# Patient Record
Sex: MALE | Race: WHITE | NOT HISPANIC OR LATINO | Employment: OTHER | URBAN - NONMETROPOLITAN AREA
[De-identification: names, ages, dates, MRNs, and addresses within clinical notes are randomized per-mention and may not be internally consistent; named-entity substitution may affect disease eponyms.]

---

## 2023-09-14 ENCOUNTER — TRANSCRIBE ORDERS (OUTPATIENT)
Dept: ADMINISTRATIVE | Facility: HOSPITAL | Age: 63
End: 2023-09-14

## 2023-09-14 DIAGNOSIS — R22.2 PALPABLE MASS OF LOWER BACK: Primary | ICD-10-CM

## 2023-10-11 ENCOUNTER — HOSPITAL ENCOUNTER (OUTPATIENT)
Dept: MRI IMAGING | Facility: HOSPITAL | Age: 63
Discharge: HOME | End: 2023-10-11
Admitting: FAMILY MEDICINE
Payer: COMMERCIAL

## 2023-10-11 DIAGNOSIS — R22.2 PALPABLE MASS OF LOWER BACK: ICD-10-CM

## 2023-10-11 PROCEDURE — 72148 MRI LUMBAR SPINE W/O DYE: CPT

## 2023-10-31 ENCOUNTER — OFFICE VISIT (OUTPATIENT)
Dept: NEUROSURGERY | Facility: CLINIC | Age: 63
End: 2023-10-31
Payer: COMMERCIAL

## 2023-10-31 VITALS — WEIGHT: 172 LBS | BODY MASS INDEX: 24.08 KG/M2 | HEIGHT: 71 IN

## 2023-10-31 DIAGNOSIS — M89.9 LESION OF THORACIC VERTEBRA: ICD-10-CM

## 2023-10-31 DIAGNOSIS — M48.062 SPINAL STENOSIS, LUMBAR REGION, WITH NEUROGENIC CLAUDICATION: Primary | ICD-10-CM

## 2023-10-31 DIAGNOSIS — Z78.9 NON-SMOKER: ICD-10-CM

## 2023-10-31 RX ORDER — SILDENAFIL CITRATE 20 MG/1
TABLET ORAL
COMMUNITY
Start: 2021-11-22

## 2023-10-31 RX ORDER — CHOLECALCIFEROL (VITAMIN D3) 125 MCG
CAPSULE ORAL
COMMUNITY
Start: 2017-01-01

## 2023-10-31 RX ORDER — MAGNESIUM GLUCONATE 30 MG(550)
30 TABLET ORAL
COMMUNITY

## 2023-10-31 NOTE — PROGRESS NOTES
Chief complaint: No chief complaint on file.      Subjective     HPI: Steve comes in today as a consult request from Dr. Oscar Graham for evaluation of T11 vertebral lesion.  This was found incidentally on lumbar MRI and radiology report indicates concern for metastatic process.  The patient denies any pain in that area but does have some chronic low back pain which has gotten progressively worse over the last 4 years.  He also is concerned about a bony protuberance over the lower lumbar area.  Over the last 3 to 4 months he has developed some sharp intermittent pain in his low back with sudden, transient weakness of bilateral lower extremities.  He has persistent radiating pain into the left buttock down the left posterior thigh that fluctuates in intensity.  He currently is utilizing chiropractic care.  He denies focal weakness as well as numbness and tingling to the lower extremities.    He does report some posterior neck pain without radiating pain to the upper extremities as well as some numbness and tingling affecting bilateral lateral forearms.    He denies any personal history of cancer as well as unexplained weight loss, night sweats, fever, shortness of breath and  abdominal pain.    Review of Systems     Past Medical History:   Diagnosis Date   • Arthritis 2015    osteoarthritis in hands   • Claustrophobia 2023    Had trouble when getting the MRI for my back.   • Hyperlipidemia 2016    I eat low carb and do intermittent fasting which raises my HDL above 100.   • Low back pain    • Lumbosacral disc disease 2019    lower back pain     No past surgical history on file.  Family History   Problem Relation Age of Onset   • Cancer Mother         olfactory neuroblastoma   • Stroke Mother    • Arthritis Father         Both hips and knees replaced   • Cancer Father         bladder cancer   • Depression Father    • Hearing loss Father    • Mental illness Brother         schizophrenia     Social History  "    Tobacco Use   • Smoking status: Never     Passive exposure: Never   • Smokeless tobacco: Never   Substance Use Topics   • Alcohol use: Yes     Alcohol/week: 2.0 standard drinks of alcohol     Types: 2 Drinks containing 0.5 oz of alcohol per week   • Drug use: Never     (Not in a hospital admission)    Allergies:  Patient has no known allergies.    Objective      Vital Signs  Ht 180.3 cm (71\")   Wt 78 kg (172 lb)   BMI 23.99 kg/m²     Physical Exam  Constitutional:       Appearance: Normal appearance. He is well-developed.   HENT:      Head: Normocephalic.   Eyes:      General: Lids are normal.      Conjunctiva/sclera: Conjunctivae normal.      Pupils: Pupils are equal, round, and reactive to light.   Cardiovascular:      Rate and Rhythm: Normal rate and regular rhythm.   Pulmonary:      Effort: Pulmonary effort is normal.      Breath sounds: Normal breath sounds.   Musculoskeletal:         General: Normal range of motion.      Cervical back: Normal range of motion.   Skin:     General: Skin is warm.   Neurological:      Mental Status: He is alert and oriented to person, place, and time.      GCS: GCS eye subscore is 4. GCS verbal subscore is 5. GCS motor subscore is 6.      Cranial Nerves: Cranial nerves 2-12 are intact. No cranial nerve deficit.      Sensory: Sensory deficit (Bilateral forearms.) present.      Gait: Gait is intact. Gait normal.      Deep Tendon Reflexes: Reflexes normal.      Reflex Scores:       Tricep reflexes are 3+ on the right side and 3+ on the left side.       Bicep reflexes are 3+ on the right side and 3+ on the left side.       Brachioradialis reflexes are 3+ on the right side and 3+ on the left side.       Patellar reflexes are 3+ on the right side and 3+ on the left side.       Achilles reflexes are 3+ on the right side and 3+ on the left side.  Psychiatric:         Speech: Speech normal.         Behavior: Behavior normal.         Thought Content: Thought content normal. "       Neurologic Exam     Mental Status   Oriented to person, place, and time.   Speech: speech is normal   Level of consciousness: alert  Knowledge: good.     Cranial Nerves   Cranial nerves II through XII intact.     CN III, IV, VI   Pupils are equal, round, and reactive to light.    Motor Exam   Muscle bulk: normal  Overall muscle tone: normal    Strength   Right deltoid: 5/5  Left deltoid: 5/5  Right biceps: 5/5  Left biceps: 5/5  Right triceps: 5/5  Left triceps: 5/5  Right wrist flexion: 5/5  Left wrist flexion: 5/5  Right wrist extension: 5/5  Left wrist extension: 5/5  Right interossei: 5/5  Left interossei: 5/5  Right iliopsoas: 5/5  Left iliopsoas: 5/5  Right quadriceps: 5/5  Left quadriceps: 5/5  Right hamstrin/5  Left hamstrin/5  Right anterior tibial: 5/5  Left anterior tibial: 5/5  Right posterior tibial: 5/5  Left posterior tibial: 5/5  Right gastroc: 5/5  Left gastroc: 5/5Grips are equal and strong bilaterally.  There is moderate thenar atrophy on the right.     Sensory Exam   Decree sensation to light touch bilateral lateral forearms with highly positive Tinel's bilateral elbows.     Gait, Coordination, and Reflexes     Gait  Gait: normal    Reflexes   Right brachioradialis: 3+  Left brachioradialis: 3+  Right biceps: 3+  Left biceps: 3+  Right triceps: 3+  Left triceps: 3+  Right patellar: 3+  Left patellar: 3+  Right achilles: 3+  Left achilles: 3+  Right Conley: absent  Left Conley: absent  Right ankle clonus: absent  Left ankle clonus: absentTandem gait is steady.       Imaging review: MRI of the lumbar spine from outside facility September of this year was reviewed and demonstrates anterior listhesis L4-5 with moderate disc degeneration.  There is high fluid signal within the facets as well as significant facet arthropathy and ligamentum flavum hypertrophy causing severe central and bilateral lateral recess stenosis, moderate bilateral neuroforaminal stenosis.  There is moderate  bilateral neuroforaminal stenosis right greater than left L5-S1.  There is a 1.6 cm bone lesion in the T11 vertebral body favoring atypical hemangioma which is predominantly isointense on T1 and hyperintense on T2 and metastatic disease is considered in the differential.        Assessment/Plan: I conferred with Dr. Graham regarding history, physical exam and imaging.  Michael has anterior listhesis with severe central stenosis at L4-5 causing his low back pain and radicular symptoms.  He also has some neurogenic claudication symptoms but is not experiencing any foot drop.  He is neurologically stable and would like to avoid surgery.  I do recommend he discontinue chiropractic adjustments.  He would like to try some physical therapy and an order was provided for lumbar treatment modalities without traction.      Regarding the T11 lesion, which is not fully evaluated on the lumbar MRI, we would recommend for better differentiation MRI with and without gadolinium of the thoracic spine.    In the interim, I recommend he utilize over-the-counter Tylenol or Advil for pain.    He indicates he is terribly claustrophobic and requests premedication before MRI.  We will send an order for for Valium to the pharmacy.    He will follow-up after the MRI of the thoracic spine is completed with Dr. Graham on the same day.  I advised the patient to call and return sooner for new or worsening complaints of weakness, paresthesias, gait disturbances, or any additional concerns.  Treatment options discussed in detail with Steve and the patient voiced understanding.  Mr. Goel agrees with this plan of care.     Patient is a dischargenonsmoker    The patient's Body mass index is 23.99 kg/m².. BMI is within normal parameters. No follow-up required.    Diagnoses and all orders for this visit:    1. Spinal stenosis, lumbar region, with neurogenic claudication (Primary)  -     Ambulatory Referral to Physical Therapy Evaluate and treat    2. Lesion  of thoracic vertebra  -     MRI Thoracic Spine With & Without Contrast; Future    3. Non-smoker          I discussed the patients findings and my recommendations with patient    Chaz Kim PA-C  10/31/23  15:14 CDT

## 2023-11-01 RX ORDER — DIAZEPAM 5 MG/1
10 TABLET ORAL TAKE AS DIRECTED
Qty: 1 TABLET | Refills: 0 | Status: SHIPPED | OUTPATIENT
Start: 2023-11-01

## 2023-11-06 ENCOUNTER — TELEPHONE (OUTPATIENT)
Dept: NEUROSURGERY | Facility: CLINIC | Age: 63
End: 2023-11-06
Payer: COMMERCIAL

## 2023-11-06 NOTE — TELEPHONE ENCOUNTER
----- Message from Steve Goel sent at 11/6/2023  8:28 AM CST -----  Regarding: upcoming MRI  Contact: [phone]  Chaz,  I don't think I can handle doing the MRI. I'm panicking and losing sleep just thinking about it.    I'm not sure 10 mg of Valium will get me through the scans. I know if I go and have to stop I'll still have to pay for the scan. Is there a way for them to give me something more if I'm having difficulty? I'm not sure if you can indicate additional sedation if needed or if that is even something they can do at the imaging center. Should I look for a facility that has an open MRI?

## 2023-11-10 ENCOUNTER — TELEPHONE (OUTPATIENT)
Dept: NEUROSURGERY | Facility: CLINIC | Age: 63
End: 2023-11-10
Payer: COMMERCIAL

## 2023-11-10 NOTE — TELEPHONE ENCOUNTER
"----- Message from Julia Richmond MA sent at 11/10/2023  9:09 AM CST -----  Regarding: FW: MRI referral  Contact: [phone]    ----- Message -----  From: Steve Goel \"Michael\"  Sent: 11/10/2023   9:05 AM CST  To: [facility] [place] [facility] Clinical Pool  Subject: MRI referral                                     Chaz,  I have found an imaging center that has an open MRI.    [facility]  [address] [place] [address]  phone [phone]  fax [phone]  They use a [facility] true high field MRI. Here is their website if you're not familiar with them.   [facility]  The only way I can do another MRI is with an open machine.   I have called them and they would need orders sent from you. Since our insurance won't cover this facility, we will be paying cash and won't need insurance approval.  Can you fax a request for the MRI to this imaging center?  Thank you    "

## 2023-11-15 ENCOUNTER — TELEPHONE (OUTPATIENT)
Dept: NEUROSURGERY | Facility: CLINIC | Age: 63
End: 2023-11-15
Payer: COMMERCIAL

## 2023-11-15 NOTE — TELEPHONE ENCOUNTER
LEFT VM TO INFORM PT THAT [facility] HAS MRI ORDER AND THEY NEED TO CONTACT THEM FOR SCHEDULING

## 2023-11-20 ENCOUNTER — TELEPHONE (OUTPATIENT)
Dept: NEUROSURGERY | Facility: CLINIC | Age: 63
End: 2023-11-20
Payer: COMMERCIAL

## 2023-11-20 NOTE — TELEPHONE ENCOUNTER
LEFT ANOTHER VM TO INFORM [facility] AND THEY NEED TO CONTACT THEM FOR SCHEDULING

## 2023-12-18 DIAGNOSIS — Z01.818 PRE-OP TESTING: Primary | ICD-10-CM

## 2023-12-20 ENCOUNTER — TELEPHONE (OUTPATIENT)
Dept: NEUROSURGERY | Facility: CLINIC | Age: 63
End: 2023-12-20
Payer: COMMERCIAL

## 2023-12-20 NOTE — TELEPHONE ENCOUNTER
I called to check on patient to see why he didn't show for his office appointment with Dr. Graham and his wife Janice states she thought she cancelled it on line states they are having a lot of financial problems right now and just can't afford it will call when things get better.